# Patient Record
Sex: MALE | URBAN - METROPOLITAN AREA
[De-identification: names, ages, dates, MRNs, and addresses within clinical notes are randomized per-mention and may not be internally consistent; named-entity substitution may affect disease eponyms.]

---

## 2022-03-09 ENCOUNTER — OFFICE VISIT (OUTPATIENT)
Dept: URBAN - METROPOLITAN AREA CLINIC 5 | Facility: CLINIC | Age: 72
End: 2022-03-09
Payer: COMMERCIAL

## 2022-03-09 DIAGNOSIS — H04.123 TEAR FILM INSUFFICIENCY OF BILATERAL LACRIMAL GLANDS: ICD-10-CM

## 2022-03-09 DIAGNOSIS — H52.223 REGULAR ASTIGMATISM, BILATERAL: ICD-10-CM

## 2022-03-09 DIAGNOSIS — H43.813 VITREOUS DEGENERATION, BILATERAL: ICD-10-CM

## 2022-03-09 DIAGNOSIS — H26.493 OTHER SECONDARY CATARACT, BILATERAL: Primary | ICD-10-CM

## 2022-03-09 DIAGNOSIS — G43.101 MIGRAINE WITH AURA, NOT INTRACTABLE, WITH STATUS MIGRAINOSUS: ICD-10-CM

## 2022-03-09 PROCEDURE — 92004 COMPRE OPH EXAM NEW PT 1/>: CPT | Performed by: OPTOMETRIST

## 2022-03-09 ASSESSMENT — VISUAL ACUITY
OS: 20/20
OD: 20/20

## 2022-03-09 ASSESSMENT — INTRAOCULAR PRESSURE
OS: 10
OD: 9

## 2022-03-09 NOTE — IMPRESSION/PLAN
Impression: Migraine with aura, not intractable, with status migrainosus: G43.101. Plan: Patient educated on condition. Advised to record triggers in future. Monitor.

## 2022-03-09 NOTE — IMPRESSION/PLAN
Impression: Regular astigmatism, bilateral: H52.223. Plan: SRx released to patient for FTW. Monitor for changes in refractive error on an annual basis or sooner prn.